# Patient Record
Sex: MALE | Race: OTHER | Employment: PART TIME | URBAN - METROPOLITAN AREA
[De-identification: names, ages, dates, MRNs, and addresses within clinical notes are randomized per-mention and may not be internally consistent; named-entity substitution may affect disease eponyms.]

---

## 2024-09-12 ENCOUNTER — OFFICE VISIT (OUTPATIENT)
Dept: URGENT CARE | Facility: MEDICAL CENTER | Age: 59
End: 2024-09-12
Payer: MEDICARE

## 2024-09-12 VITALS
DIASTOLIC BLOOD PRESSURE: 92 MMHG | BODY MASS INDEX: 46.65 KG/M2 | WEIGHT: 315 LBS | HEIGHT: 69 IN | HEART RATE: 82 BPM | SYSTOLIC BLOOD PRESSURE: 140 MMHG | TEMPERATURE: 97.8 F | OXYGEN SATURATION: 97 % | RESPIRATION RATE: 18 BRPM

## 2024-09-12 DIAGNOSIS — S05.02XA ABRASION OF LEFT CORNEA, INITIAL ENCOUNTER: Primary | ICD-10-CM

## 2024-09-12 PROCEDURE — 99213 OFFICE O/P EST LOW 20 MIN: CPT | Performed by: NURSE PRACTITIONER

## 2024-09-12 RX ORDER — TOBRAMYCIN 3 MG/ML
1 SOLUTION/ DROPS OPHTHALMIC
Qty: 1.8 ML | Refills: 0 | Status: SHIPPED | OUTPATIENT
Start: 2024-09-12 | End: 2024-09-19

## 2024-09-12 NOTE — PROGRESS NOTES
[facility]        NAME: Keyon Cobb is a 59 y.o. male  : 1965    MRN: [id number]  DATE: 2024  TIME: 7:44 PM    Assessment and Plan   Abrasion of left cornea, initial encounter [S05.02XA]  1. Abrasion of left cornea, initial encounter  tobramycin (TOBREX) 0.3 % SOLN        Patient in NAD and VSS upon exam. Discussed with patient exam positive for corneal abrasion. Will order abx drops, discussed supportive care and return precautions. If not improving, patient will follow up with Ophthalmology and if worsening patient will go to ED.      Patient Instructions       Follow up with PCP in 3-5 days.  Proceed to  ER if symptoms worsen.    If tests have been performed at [facility], our office will contact you with results if changes need to be made to the care plan discussed with you at the visit.  You can review your full results on [facility].    Chief Complaint     Chief Complaint   Patient presents with    eye irritation     Pt states his left eye has been irritated for the last two to three days. The eye is burning and itching.          History of Present Illness       Started: 2-3 days  Positive: left eye irritation, blurry vision, photosensitivity, painful, tearing  Treatment: eye drops  Denies any known injury  Worse at night        Review of Systems   Review of Systems   Eyes:  Positive for photophobia, pain, discharge, redness and visual disturbance. Negative for itching.   All other systems reviewed and are negative.        Current Medications       Current Outpatient Medications:     albuterol (PROVENTIL HFA,VENTOLIN HFA) 90 mcg/act inhaler, Inhale 2 puffs, Disp: , Rfl:     ibuprofen (MOTRIN) 600 mg tablet, Take 1 tablet (600 mg total) by mouth every 6 (six) hours as needed for fever or headaches, Disp: 30 tablet, Rfl: 0    metFORMIN (GLUCOPHAGE) 1000 MG tablet, Take 1 tablet (1,000 mg total) by mouth 2 (two) times a day, Disp: 180 tablet, Rfl: 1    metoprolol  "tartrate (LOPRESSOR) 100 mg tablet, TAKE 1 TABLET BY MOUTH EVERY 12 HOURS, Disp: 180 tablet, Rfl: 1    tobramycin (TOBREX) 0.3 % SOLN, Administer 1 drop into the left eye every 4 (four) hours while awake for 7 days, Disp: 1.8 mL, Rfl: 0    Alcohol Swabs (ALCOHOL PREP) PADS, by Does not apply route 3 (three) times a day, Disp: 200 each, Rfl: 4    Blood Glucose Monitoring Suppl (FREESTYLE LITE) JOSY, Testing twice daily, Disp: 1 each, Rfl: 0    glucose blood (FREESTYLE LITE) test strip, Testing twice daily, Disp: 100 each, Rfl: 5    Lancets (FREESTYLE) lancets, Testing twice daily, Disp: 100 each, Rfl: 5    Current Allergies     Allergies as of 09/12/2024 - Reviewed 09/12/2024   Allergen Reaction Noted    Lisinopril Cough 05/09/2016    Losartan Headache 05/09/2016            The following portions of the patient's history were reviewed and updated as appropriate: allergies, current medications, past family history, past medical history, past social history, past surgical history and problem list.     Past Medical History:   Diagnosis Date    Diabetes mellitus (HCC)     No known health problems        Past Surgical History:   Procedure Laterality Date    NO PAST SURGERIES         Family History   Problem Relation Age of Onset    No Known Problems Mother     No Known Problems Father          Medications have been verified.        Objective   /92 (BP Location: Left arm, Patient Position: Sitting)   Pulse 82   Temp 97.8 °F (36.6 °C) (Tympanic)   Resp 18   Ht 5' 9\" (1.753 m)   Wt (!) 165 kg (364 lb 6.4 oz)   SpO2 97%   BMI 53.81 kg/m²   No LMP for male patient.       Physical Exam     Physical Exam  Constitutional:       General: He is not in acute distress.     Appearance: Normal appearance. He is not ill-appearing.   HENT:      Head: Normocephalic and atraumatic.   Eyes:     Cardiovascular:      Rate and Rhythm: Normal rate.   Pulmonary:      Effort: Pulmonary effort is normal.   Skin:     General: Skin is " warm and dry.   Neurological:      Mental Status: He is alert.

## 2024-09-13 NOTE — PATIENT INSTRUCTIONS
Antibiotic eye drops as directed  Can use rewetting drops as needed for comfort  If not improving, follow up with Ophthalmology  If worsening, please go to the Emergency Room